# Patient Record
Sex: MALE | Race: WHITE | Employment: FULL TIME | URBAN - METROPOLITAN AREA
[De-identification: names, ages, dates, MRNs, and addresses within clinical notes are randomized per-mention and may not be internally consistent; named-entity substitution may affect disease eponyms.]

---

## 2018-08-06 ENCOUNTER — HOSPITAL ENCOUNTER (EMERGENCY)
Facility: HOSPITAL | Age: 28
Discharge: HOME | End: 2018-08-06
Payer: COMMERCIAL

## 2018-08-06 VITALS
OXYGEN SATURATION: 100 % | HEART RATE: 71 BPM | SYSTOLIC BLOOD PRESSURE: 155 MMHG | TEMPERATURE: 98 F | RESPIRATION RATE: 18 BRPM | HEIGHT: 78 IN | WEIGHT: 300 LBS | BODY MASS INDEX: 34.71 KG/M2 | DIASTOLIC BLOOD PRESSURE: 78 MMHG
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
## 2018-08-06 DIAGNOSIS — S06.0X0A CONCUSSION WITHOUT LOSS OF CONSCIOUSNESS, INITIAL ENCOUNTER: Primary | ICD-10-CM

## 2018-08-06 DIAGNOSIS — S16.1XXA STRAIN OF NECK MUSCLE, INITIAL ENCOUNTER: ICD-10-CM

## 2018-08-06 PROCEDURE — 99283 EMERGENCY DEPT VISIT LOW MDM: CPT
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
## 2023-08-07 LAB
ALBUMIN SERPL-MCNC: NORMAL G/DL
ALP BLD-CCNC: NORMAL U/L
ALT SERPL-CCNC: NORMAL U/L
ANION GAP SERPL CALCULATED.3IONS-SCNC: NORMAL MMOL/L
AST SERPL-CCNC: NORMAL U/L
BILIRUB SERPL-MCNC: NORMAL MG/DL
BUN BLDV-MCNC: NORMAL MG/DL
CALCIUM SERPL-MCNC: NORMAL MG/DL
CHLORIDE BLD-SCNC: NORMAL MMOL/L
CO2: NORMAL
CREAT SERPL-MCNC: NORMAL MG/DL
EGFR: NORMAL
GLUCOSE BLD-MCNC: NORMAL MG/DL
POTASSIUM SERPL-SCNC: NORMAL MMOL/L
SODIUM BLD-SCNC: NORMAL MMOL/L
TOTAL PROTEIN: NORMAL

## 2023-11-20 VITALS
BODY MASS INDEX: 37.19 KG/M2 | DIASTOLIC BLOOD PRESSURE: 86 MMHG | HEART RATE: 93 BPM | SYSTOLIC BLOOD PRESSURE: 134 MMHG | OXYGEN SATURATION: 99 % | WEIGHT: 315 LBS | TEMPERATURE: 98 F | HEIGHT: 77 IN | RESPIRATION RATE: 18 BRPM

## 2024-02-04 PROBLEM — F98.8 ATTENTION DEFICIT DISORDER: Status: ACTIVE | Noted: 2024-02-04

## 2024-02-05 ENCOUNTER — OFFICE VISIT (OUTPATIENT)
Age: 34
End: 2024-02-05
Payer: COMMERCIAL

## 2024-02-05 VITALS
OXYGEN SATURATION: 98 % | DIASTOLIC BLOOD PRESSURE: 80 MMHG | HEIGHT: 77 IN | SYSTOLIC BLOOD PRESSURE: 124 MMHG | BODY MASS INDEX: 37.19 KG/M2 | HEART RATE: 95 BPM | TEMPERATURE: 98.1 F | WEIGHT: 315 LBS

## 2024-02-05 DIAGNOSIS — F98.8 ADD (ATTENTION DEFICIT DISORDER) WITHOUT HYPERACTIVITY: ICD-10-CM

## 2024-02-05 DIAGNOSIS — F90.9 ATTENTION DEFICIT HYPERACTIVITY DISORDER (ADHD), UNSPECIFIED ADHD TYPE: Primary | ICD-10-CM

## 2024-02-05 PROBLEM — E66.811 OBESITY (BMI 30.0-34.9): Status: ACTIVE | Noted: 2023-03-30

## 2024-02-05 PROBLEM — E66.9 OBESITY (BMI 30.0-34.9): Status: ACTIVE | Noted: 2023-03-30

## 2024-02-05 PROCEDURE — 99213 OFFICE O/P EST LOW 20 MIN: CPT | Performed by: FAMILY MEDICINE

## 2024-02-05 RX ORDER — LEVOTHYROXINE SODIUM 0.2 MG/1
200 TABLET ORAL DAILY
COMMUNITY

## 2024-02-05 RX ORDER — METHYLPHENIDATE HYDROCHLORIDE 54 MG/1
54 TABLET, EXTENDED RELEASE ORAL EVERY MORNING
COMMUNITY
End: 2024-02-05 | Stop reason: SDUPTHER

## 2024-02-05 RX ORDER — METHYLPHENIDATE HYDROCHLORIDE 54 MG/1
54 TABLET, EXTENDED RELEASE ORAL EVERY MORNING
Qty: 90 TABLET | Refills: 0 | Status: SHIPPED | OUTPATIENT
Start: 2024-02-05 | End: 2024-05-05

## 2024-02-05 SDOH — ECONOMIC STABILITY: HOUSING INSECURITY
IN THE LAST 12 MONTHS, WAS THERE A TIME WHEN YOU DID NOT HAVE A STEADY PLACE TO SLEEP OR SLEPT IN A SHELTER (INCLUDING NOW)?: NO

## 2024-02-05 SDOH — ECONOMIC STABILITY: FOOD INSECURITY: WITHIN THE PAST 12 MONTHS, YOU WORRIED THAT YOUR FOOD WOULD RUN OUT BEFORE YOU GOT MONEY TO BUY MORE.: NEVER TRUE

## 2024-02-05 SDOH — ECONOMIC STABILITY: INCOME INSECURITY: HOW HARD IS IT FOR YOU TO PAY FOR THE VERY BASICS LIKE FOOD, HOUSING, MEDICAL CARE, AND HEATING?: NOT HARD AT ALL

## 2024-02-05 SDOH — ECONOMIC STABILITY: FOOD INSECURITY: WITHIN THE PAST 12 MONTHS, THE FOOD YOU BOUGHT JUST DIDN'T LAST AND YOU DIDN'T HAVE MONEY TO GET MORE.: NEVER TRUE

## 2024-02-05 ASSESSMENT — PATIENT HEALTH QUESTIONNAIRE - PHQ9
SUM OF ALL RESPONSES TO PHQ9 QUESTIONS 1 & 2: 0
SUM OF ALL RESPONSES TO PHQ QUESTIONS 1-9: 0
2. FEELING DOWN, DEPRESSED OR HOPELESS: 0
1. LITTLE INTEREST OR PLEASURE IN DOING THINGS: 0
SUM OF ALL RESPONSES TO PHQ QUESTIONS 1-9: 0

## 2024-02-05 NOTE — PROGRESS NOTES
Abdoul Pittman (: 1990) is a 33 y.o. male is here for evaluation of the following chief complaint(s): Medication Refill (Adderal refill.)    Assessment/Plan:   1. Attention deficit hyperactivity disorder (ADHD), unspecified ADHD type    No follow-ups on file.  Subjective/Objective:   Since last visit: no change.  Medication compliance: all of the time.  Side effects from medication include: none.   has been reviewed.     A comprehensive review of systems was negative.      Exam:  General: alert, appears stated age, and cooperative  Heart exam: normal rate, regular rhythm, normal S1, S2, no murmurs, rubs, clicks or gallops  Lung exam: clear to auscultation bilaterally  Neuro: no gross deficits   /80   Pulse 95   Temp 98.1 °F (36.7 °C)   Ht 1.956 m (6' 5\")   Wt (!) 144.1 kg (317 lb 9.6 oz)   SpO2 98%   BMI 37.66 kg/m²     On this date 2024 I have spent 20 minutes reviewing previous notes, test results and face to face with the patient discussing the diagnosis and importance of compliance with the treatment plan as well as documenting on the day of the visit.  An electronic signature was used to authenticate this note.  -- Bebeto Roth MD

## 2024-05-06 ENCOUNTER — OFFICE VISIT (OUTPATIENT)
Age: 34
End: 2024-05-06
Payer: COMMERCIAL

## 2024-05-06 VITALS
WEIGHT: 310 LBS | HEIGHT: 77 IN | HEART RATE: 72 BPM | BODY MASS INDEX: 36.6 KG/M2 | OXYGEN SATURATION: 98 % | TEMPERATURE: 97.4 F | DIASTOLIC BLOOD PRESSURE: 80 MMHG | RESPIRATION RATE: 18 BRPM | SYSTOLIC BLOOD PRESSURE: 114 MMHG

## 2024-05-06 DIAGNOSIS — M54.50 ACUTE MIDLINE LOW BACK PAIN WITHOUT SCIATICA: ICD-10-CM

## 2024-05-06 DIAGNOSIS — E03.9 HYPOTHYROIDISM, UNSPECIFIED TYPE: Primary | ICD-10-CM

## 2024-05-06 DIAGNOSIS — E66.9 OBESITY (BMI 35.0-39.9 WITHOUT COMORBIDITY): ICD-10-CM

## 2024-05-06 DIAGNOSIS — M25.532 LEFT WRIST PAIN: ICD-10-CM

## 2024-05-06 DIAGNOSIS — R53.83 OTHER FATIGUE: ICD-10-CM

## 2024-05-06 DIAGNOSIS — F98.8 ADD (ATTENTION DEFICIT DISORDER) WITHOUT HYPERACTIVITY: ICD-10-CM

## 2024-05-06 DIAGNOSIS — Z79.899 ENCOUNTER FOR LONG-TERM (CURRENT) USE OF MEDICATIONS: ICD-10-CM

## 2024-05-06 PROCEDURE — 99214 OFFICE O/P EST MOD 30 MIN: CPT | Performed by: FAMILY MEDICINE

## 2024-05-06 RX ORDER — METHYLPHENIDATE HYDROCHLORIDE 54 MG/1
54 TABLET, EXTENDED RELEASE ORAL EVERY MORNING
Qty: 90 TABLET | Refills: 0 | Status: SHIPPED | OUTPATIENT
Start: 2024-05-06 | End: 2024-08-04

## 2024-05-06 ASSESSMENT — ENCOUNTER SYMPTOMS
GASTROINTESTINAL NEGATIVE: 1
BACK PAIN: 1
RESPIRATORY NEGATIVE: 1

## 2024-05-06 NOTE — PROGRESS NOTES
Abdoul Pittman (:  1990) is a 33 y.o. male,Established patient, here for evaluation of the following chief complaint(s):  Medication Refill (Adderrall. )      Assessment & Plan   1. Hypothyroidism, unspecified type  -     TSH; Future  2. ADD (attention deficit disorder) without hyperactivity  -     Methylphenidate 54 MG CR tablet; Take 1 tablet by mouth every morning for 90 days. Max Daily Amount: 54 mg, Disp-90 tablet, R-0Normal  3. Other fatigue  -     CBC with Auto Differential; Future  -     TSH; Future  4. Obesity (BMI 35.0-39.9 without comorbidity)  -     Comprehensive Metabolic Panel; Future  -     Lipid Panel; Future  5. Encounter for long-term (current) use of medications  -     Comprehensive Metabolic Panel; Future  6. Left wrist pain  7. Acute midline low back pain without sciatica      Return in about 3 months (around 2024).       Subjective   Medication Refill  Associated symptoms include arthralgias.     33-year-old with hypothyroidism and ADD comes in for his 3-month fasting checkup.  He has been having problems with his left wrist.  He will see an orthopedist in [place] where he lives.  Also occasional back pain from golfing.  He had an evaluation by eye doctors in [place] for some double vision when he looks up.  Labs MRIs and CTs all were stable.  Review of Systems   Constitutional: Negative.    HENT: Negative.     Eyes:  Positive for visual disturbance.   Respiratory: Negative.     Cardiovascular: Negative.    Gastrointestinal: Negative.    Genitourinary: Negative.    Musculoskeletal:  Positive for arthralgias and back pain.        Left wrist pain   Skin: Negative.    Neurological: Negative.    Psychiatric/Behavioral: Negative.            Objective /80   Pulse 72   Temp 97.4 °F (36.3 °C)   Resp 18   Ht 1.956 m (6' 5\")   Wt (!) 140.6 kg (310 lb)   SpO2 98%   BMI 36.76 kg/m²    Physical Exam  Vitals reviewed.   Constitutional:       General: He is not in acute

## 2024-07-18 RX ORDER — LEVOTHYROXINE SODIUM 0.2 MG/1
200 TABLET ORAL DAILY
Qty: 90 TABLET | Refills: 3 | Status: SHIPPED | OUTPATIENT
Start: 2024-07-18

## 2024-07-18 NOTE — TELEPHONE ENCOUNTER
Patient called for medication refill    Requested Prescriptions     Pending Prescriptions Disp Refills    levothyroxine (SYNTHROID) 200 MCG tablet [Pharmacy Med Name: LEVOTHYROXINE 200 MCG TABLET] 90 tablet 3     Sig: TAKE 1 TABLET BY MOUTH EVERY DAY     Last Appt: 5/6/2024   Next Appt: 7/29/2024

## 2024-07-29 ENCOUNTER — OFFICE VISIT (OUTPATIENT)
Age: 34
End: 2024-07-29
Payer: COMMERCIAL

## 2024-07-29 VITALS
HEIGHT: 77 IN | SYSTOLIC BLOOD PRESSURE: 130 MMHG | BODY MASS INDEX: 36.86 KG/M2 | OXYGEN SATURATION: 98 % | TEMPERATURE: 97.3 F | WEIGHT: 312.2 LBS | DIASTOLIC BLOOD PRESSURE: 82 MMHG | RESPIRATION RATE: 18 BRPM | HEART RATE: 94 BPM

## 2024-07-29 DIAGNOSIS — E03.9 HYPOTHYROIDISM, UNSPECIFIED TYPE: Primary | ICD-10-CM

## 2024-07-29 DIAGNOSIS — Z79.899 ENCOUNTER FOR LONG-TERM (CURRENT) USE OF MEDICATIONS: ICD-10-CM

## 2024-07-29 DIAGNOSIS — F98.8 ADD (ATTENTION DEFICIT DISORDER) WITHOUT HYPERACTIVITY: ICD-10-CM

## 2024-07-29 DIAGNOSIS — E66.9 OBESITY (BMI 35.0-39.9 WITHOUT COMORBIDITY): ICD-10-CM

## 2024-07-29 DIAGNOSIS — R53.83 OTHER FATIGUE: ICD-10-CM

## 2024-07-29 PROCEDURE — 99214 OFFICE O/P EST MOD 30 MIN: CPT | Performed by: FAMILY MEDICINE

## 2024-07-29 RX ORDER — METHYLPHENIDATE HYDROCHLORIDE 54 MG/1
54 TABLET, EXTENDED RELEASE ORAL EVERY MORNING
Qty: 90 TABLET | Refills: 0 | Status: SHIPPED | OUTPATIENT
Start: 2024-07-29 | End: 2024-10-27

## 2024-07-29 NOTE — PROGRESS NOTES
Abdoul Pittman (: 1990) is a 33 y.o. male is here for evaluation of the following chief complaint(s): Medication Refill    Assessment/Plan:   1. Hypothyroidism, unspecified type  -     TSH; Future  2. ADD (attention deficit disorder) without hyperactivity  -     Methylphenidate 54 MG CR tablet; Take 1 tablet by mouth every morning for 90 days. Max Daily Amount: 54 mg, Disp-90 tablet, R-0Normal  3. Other fatigue  -     CBC with Auto Differential; Future  -     TSH; Future  4. Obesity (BMI 35.0-39.9 without comorbidity)  -     Comprehensive Metabolic Panel; Future  -     Lipid Panel; Future  5. Encounter for long-term (current) use of medications  -     Comprehensive Metabolic Panel; Future    Return in about 3 months (around 10/29/2024).  Subjective/Objective:   Since last visit: no change.  Medication compliance: all of the time.  Side effects from medication include: none.   has been reviewed.  Patient denies chest pain or shortness of breath.  Does complain of some left wrist pain and low back pain due to golfing.  His brother just had a baby I recommended he update his tetanus pertussis vaccine.     Pertinent items are noted in HPI.       BP (!) 142/94   Pulse 94   Temp 97.3 °F (36.3 °C)   Resp 18   Ht 1.956 m (6' 5\")   Wt (!) 141.6 kg (312 lb 3.2 oz)   SpO2 98%   BMI 37.02 kg/m²   Chest: Clear to auscultation  Heart: Regular rate and rhythm without murmur gallop or rub  Neck: No bruits or masses  Skin: No jaundice no pallor  Neuro: No deficits    An electronic signature was used to authenticate this note.  -- Bebeto Roth MD     Note: This report was transcribed using Dragon voice recognition software.  Every effort was made to ensure accuracy, however inadvertent computerized transcription errors may be present.

## 2024-11-01 ENCOUNTER — OFFICE VISIT (OUTPATIENT)
Age: 34
End: 2024-11-01
Payer: COMMERCIAL

## 2024-11-01 VITALS
RESPIRATION RATE: 18 BRPM | BODY MASS INDEX: 37.15 KG/M2 | HEIGHT: 77 IN | HEART RATE: 103 BPM | DIASTOLIC BLOOD PRESSURE: 86 MMHG | OXYGEN SATURATION: 99 % | TEMPERATURE: 97.4 F | SYSTOLIC BLOOD PRESSURE: 130 MMHG | WEIGHT: 314.6 LBS

## 2024-11-01 DIAGNOSIS — F98.8 ADD (ATTENTION DEFICIT DISORDER) WITHOUT HYPERACTIVITY: ICD-10-CM

## 2024-11-01 PROCEDURE — 99213 OFFICE O/P EST LOW 20 MIN: CPT | Performed by: FAMILY MEDICINE

## 2024-11-01 RX ORDER — METHYLPHENIDATE HYDROCHLORIDE 54 MG/1
54 TABLET, EXTENDED RELEASE ORAL EVERY MORNING
Qty: 90 TABLET | Refills: 0 | Status: SHIPPED | OUTPATIENT
Start: 2024-11-01 | End: 2025-01-30

## 2024-11-01 NOTE — PROGRESS NOTES
Abdoul Pittman (: 1990) is a 33 y.o. male is here for evaluation of the following chief complaint(s): Medication Refill    Assessment/Plan:   Assessment & Plan  ADD (attention deficit disorder) without hyperactivity     Orders:    Methylphenidate 54 MG CR tablet; Take 1 tablet by mouth every morning for 90 days. Max Daily Amount: 54 mg stable on current meds with no side effects follow-up in 3-month      Return in about 3 months (around 2025).  Subjective/Objective:   Since last visit: no change.  Medication compliance: all of the time.  Side effects from medication include: none.   has been reviewed.     A comprehensive review of systems was negative.       BP (!) 130/94   Pulse (!) 103   Temp 97.4 °F (36.3 °C)   Resp 18   Ht 1.956 m (6' 5\")   Wt (!) 142.7 kg (314 lb 9.6 oz)   SpO2 99%   BMI 37.31 kg/m²   Chest: Clear to auscultation  Heart: Regular rate rhythm without murmur gallop or rub  Neuro: Alert and oriented x 3 with no deficits    An electronic signature was used to authenticate this note.  -- Bebeto Roth MD     Note: This report was transcribed using Dragon voice recognition software.  Every effort was made to ensure accuracy, however inadvertent computerized transcription errors may be present.

## 2025-01-27 DIAGNOSIS — E66.9 OBESITY (BMI 35.0-39.9 WITHOUT COMORBIDITY): ICD-10-CM

## 2025-01-27 DIAGNOSIS — R53.83 OTHER FATIGUE: ICD-10-CM

## 2025-01-27 DIAGNOSIS — E03.9 HYPOTHYROIDISM, UNSPECIFIED TYPE: Primary | ICD-10-CM

## 2025-01-27 LAB
ALBUMIN: NORMAL
ALP BLD-CCNC: NORMAL U/L
ALT SERPL-CCNC: NORMAL U/L
ANION GAP SERPL CALCULATED.3IONS-SCNC: NORMAL MMOL/L
AST SERPL-CCNC: NORMAL U/L
BASOPHILS ABSOLUTE: NORMAL
BASOPHILS RELATIVE PERCENT: NORMAL
BILIRUB SERPL-MCNC: NORMAL MG/DL
BUN BLDV-MCNC: NORMAL MG/DL
CALCIUM SERPL-MCNC: NORMAL MG/DL
CHLORIDE BLD-SCNC: NORMAL MMOL/L
CHOLESTEROL, TOTAL: NORMAL
CHOLESTEROL/HDL RATIO: NORMAL
CO2: NORMAL
CREAT SERPL-MCNC: NORMAL MG/DL
EOSINOPHILS ABSOLUTE: NORMAL
EOSINOPHILS RELATIVE PERCENT: NORMAL
GFR, ESTIMATED: NORMAL
GLUCOSE BLD-MCNC: NORMAL MG/DL
HCT VFR BLD CALC: NORMAL %
HDLC SERPL-MCNC: NORMAL MG/DL
HEMOGLOBIN: NORMAL
LDL CHOLESTEROL: NORMAL
LYMPHOCYTES ABSOLUTE: NORMAL
LYMPHOCYTES RELATIVE PERCENT: NORMAL
MCH RBC QN AUTO: NORMAL PG
MCHC RBC AUTO-ENTMCNC: NORMAL G/DL
MCV RBC AUTO: NORMAL FL
MONOCYTES ABSOLUTE: NORMAL
MONOCYTES RELATIVE PERCENT: NORMAL
NEUTROPHILS ABSOLUTE: NORMAL
NEUTROPHILS RELATIVE PERCENT: NORMAL
NONHDLC SERPL-MCNC: NORMAL MG/DL
PDW BLD-RTO: NORMAL %
PLATELET # BLD: NORMAL 10*3/UL
PMV BLD AUTO: NORMAL FL
POTASSIUM SERPL-SCNC: NORMAL MMOL/L
RBC # BLD: NORMAL 10*6/UL
SODIUM BLD-SCNC: NORMAL MMOL/L
TOTAL PROTEIN: NORMAL
TRIGL SERPL-MCNC: NORMAL MG/DL
TSH SERPL DL<=0.05 MIU/L-ACNC: 1.03 UIU/ML
VLDLC SERPL CALC-MCNC: NORMAL MG/DL
WBC # BLD: NORMAL 10*3/UL

## 2025-01-31 ENCOUNTER — OFFICE VISIT (OUTPATIENT)
Age: 35
End: 2025-01-31
Payer: COMMERCIAL

## 2025-01-31 VITALS
BODY MASS INDEX: 37.19 KG/M2 | DIASTOLIC BLOOD PRESSURE: 86 MMHG | WEIGHT: 315 LBS | RESPIRATION RATE: 18 BRPM | HEIGHT: 77 IN | OXYGEN SATURATION: 99 % | HEART RATE: 105 BPM | SYSTOLIC BLOOD PRESSURE: 132 MMHG | TEMPERATURE: 98.4 F

## 2025-01-31 DIAGNOSIS — E03.9 HYPOTHYROIDISM, UNSPECIFIED TYPE: Primary | ICD-10-CM

## 2025-01-31 DIAGNOSIS — F98.8 ADD (ATTENTION DEFICIT DISORDER) WITHOUT HYPERACTIVITY: ICD-10-CM

## 2025-01-31 PROCEDURE — 99213 OFFICE O/P EST LOW 20 MIN: CPT | Performed by: FAMILY MEDICINE

## 2025-01-31 RX ORDER — METHYLPHENIDATE HYDROCHLORIDE 54 MG/1
54 TABLET, EXTENDED RELEASE ORAL EVERY MORNING
Qty: 90 TABLET | Refills: 0 | Status: SHIPPED | OUTPATIENT
Start: 2025-01-31 | End: 2025-05-01

## 2025-01-31 SDOH — ECONOMIC STABILITY: FOOD INSECURITY: WITHIN THE PAST 12 MONTHS, THE FOOD YOU BOUGHT JUST DIDN'T LAST AND YOU DIDN'T HAVE MONEY TO GET MORE.: NEVER TRUE

## 2025-01-31 SDOH — ECONOMIC STABILITY: FOOD INSECURITY: WITHIN THE PAST 12 MONTHS, YOU WORRIED THAT YOUR FOOD WOULD RUN OUT BEFORE YOU GOT MONEY TO BUY MORE.: NEVER TRUE

## 2025-01-31 ASSESSMENT — PATIENT HEALTH QUESTIONNAIRE - PHQ9
2. FEELING DOWN, DEPRESSED OR HOPELESS: NOT AT ALL
SUM OF ALL RESPONSES TO PHQ QUESTIONS 1-9: 0
SUM OF ALL RESPONSES TO PHQ9 QUESTIONS 1 & 2: 0
SUM OF ALL RESPONSES TO PHQ QUESTIONS 1-9: 0
1. LITTLE INTEREST OR PLEASURE IN DOING THINGS: NOT AT ALL

## 2025-01-31 NOTE — PROGRESS NOTES
Abdoul Pittman (: 1990) is a 34 y.o. male is here for evaluation of the following chief complaint(s): Medication Refill    Assessment/Plan:   Assessment & Plan  ADD (attention deficit disorder) without hyperactivity stable on current medication continue current dose follow-up in 3 months OARRS was checked  Hypothyroidism TSH stable continue current dose  Orders:    Methylphenidate 54 MG CR tablet; Take 1 tablet by mouth every morning for 90 days. Max Daily Amount: 54 mg stable on current meds with no side effects follow-up in 3-month      Return in about 3 months (around 2025).  Subjective/Objective:   Since last visit: no change.  Medication compliance: all of the time.  Side effects from medication include: none.   has been reviewed.  No complaints fasting lab work were reviewed and are stable.  TSH stable continue same dose of Synthroid     A comprehensive review of systems was negative.       /86   Pulse (!) 105   Temp 98.4 °F (36.9 °C)   Resp 18   Ht 1.956 m (6' 5\")   Wt (!) 144.4 kg (318 lb 6.4 oz)   SpO2 99%   BMI 37.76 kg/m²   Chest: Clear to auscultation  Heart: Regular rate rhythm without murmur gallop or rub  Neuro: Alert and oriented x 3 with no deficits    An electronic signature was used to authenticate this note.  -- Bebeto Roth MD     Note: This report was transcribed using Dragon voice recognition software.  Every effort was made to ensure accuracy, however inadvertent computerized transcription errors may be present.

## 2025-03-27 DIAGNOSIS — E66.9 OBESITY (BMI 35.0-39.9 WITHOUT COMORBIDITY): ICD-10-CM

## 2025-03-27 DIAGNOSIS — E03.9 HYPOTHYROIDISM, UNSPECIFIED TYPE: ICD-10-CM

## 2025-03-27 DIAGNOSIS — R53.83 OTHER FATIGUE: ICD-10-CM

## (undated) NOTE — ED AVS SNAPSHOT
Perri Walsh. MRN: [id number]    Department:  [facility] Emergency Department   Date of Visit:  8/6/2018           Disclosure     Insurance plans vary and the physician(s) referred by the ER may not be covered by your plan.  Please contact tell this physician (or your personal doctor if your instructions are to return to your personal doctor) about any new or lasting problems. The primary care or specialist physician will see patients referred from the [facility] Emergency Department.  Genie Arthur